# Patient Record
Sex: MALE | Race: WHITE | Employment: FULL TIME | URBAN - METROPOLITAN AREA
[De-identification: names, ages, dates, MRNs, and addresses within clinical notes are randomized per-mention and may not be internally consistent; named-entity substitution may affect disease eponyms.]

---

## 2017-11-06 ENCOUNTER — HOSPITAL ENCOUNTER (OUTPATIENT)
Dept: PHYSICAL THERAPY | Age: 57
Discharge: HOME | End: 2017-11-06
Payer: COMMERCIAL

## 2017-11-06 PROCEDURE — 97162 PT EVAL MOD COMPLEX 30 MIN: CPT

## 2017-11-06 NOTE — PROGRESS NOTES
Ambulatory/Rehab Services H2 Model Falls Risk Assessment    Risk Factor Pts. ·   Confusion/Disorientation/Impulsivity  []    4 ·   Symptomatic Depression  []   2 ·   Altered Elimination  []   1 ·   Dizziness/Vertigo  []   1 ·   Gender (Male)  [x]   1 ·   Any administered antiepileptics (anticonvulsants):  []   2 ·   Any administered benzodiazepines:  []   1 ·   Visual Impairment (specify):  []   1 ·   Portable Oxygen Use  []   1 ·   Orthostatic ? BP  []   1 ·   History of Recent Falls (within 3 mos.)  []   5     Ability to Rise from Chair (choose one) Pts. ·   Ability to rise in a single movement  []   0 ·   Pushes up, successful in one attempt  []   1 ·   Multiple attempts, but successful  []   3 ·   Unable to rise without assistance  []   4   Total: (5 or greater = High Risk) 1     Falls Prevention Plan:   []                Physical Limitations to Exercise (specify):   []                Mobility Assistance Device (type):   []                Exercise/Equipment Adaptation (specify):    ©2010 [facility] [address] Patent #[id number].  Federal Law prohibits the replication, distribution or use without written permission from [facility] [employer]

## 2017-11-06 NOTE — PROGRESS NOTES
Do Andrews  : 1960 [facility] [place] [address] [facility]  [address], [address]  [phone]   [phone]       OUTPATIENT PHYSICAL THERAPY:Initial Assessment 2017    ICD-10: Treatment Diagnosis:  R26.2 Difficulty in walking, not elsewhere classified   Precautions/Allergies:   Review of patient's allergies indicates not on file. Fall Risk Score: 1 (? 5 = High Risk)  MD Orders: Evaluate and treat MEDICAL/REFERRING DIAGNOSIS:  Strain of muscle, fascia and tendon of the posterior muscle group at thigh level, right thigh, initial encounter [address]: 3/2017  REFERRING PHYSICIAN: Geena Jasso MD  RETURN PHYSICIAN APPOINTMENT: TBD     INITIAL ASSESSMENT:  Mr. Mark Bynum reported running in 2017 and injuring his R hamstring. Since the initial injury his pain levels and function have improved, but he is still unable to run more than a short distance. He has been referred to physical therapy for treatment. *    PROBLEM LIST (Impacting functional limitations):  1. Decreased Strength  2. Decreased ADL/Functional Activities  3. Increased Pain  4. Decreased Activity Tolerance INTERVENTIONS PLANNED:  1. Home Exercise Program (HEP)  2. Neuromuscular Re-education/Strengthening  3. Range of Motion (ROM)  4. Therapeutic Exercise/Strengthening   TREATMENT PLAN:  Effective Dates: 2017 TO 2018. Frequency/Duration: 1 time a week for 8 weeks  GOALS: (Goals have been discussed and agreed upon with patient.)  SHORT-TERM FUNCTIONAL GOALS: Time Frame: 4 weeks  1. Increase LEFS 4 points to decrease pain 1-2 levels. 2. Increase R LE strength to decrease pain 1-2 levels. 3. Increase R LE strength to allow initiation of running again. Jf Mullins DISCHARGE GOALS: Time Frame: 8 weeks  1. Increase LEFS 9 points to decrease pain +2 levels. 2. Increase R LE strength to allow run again. .  3. Demonstrate independence in home exercises to allow DC from physical therapy. Rehabilitation Potential For Stated Goals: Good  Regarding Judy Johnson's therapy, I certify that the treatment plan above will be carried out by a therapist or under their direction. Thank you for this referral,  Griselda Borer., PT     Referring Physician Signature: Darcy Limon MD              Date                    The information in this section was collected on 11/6/2017 (except where otherwise noted). HISTORY:   History of Present Injury/Illness (Reason for Referral): The patient reported running in March 2017 and injuring his R hamstring. Since the initial injury his pain levels and function have improved, but he is still unable to run more than a short distance. He has been referred to physical therapy for treatment. Past Medical History/Comorbidities:   Mr. Mary Jo Alcazar  has no past medical history on file. Mr. Mary Jo Alcazar  has no past surgical history on file. Social History/Living Environment:      Prior Level of Function/Work/Activity:  [employer]  Dominant Side:         RIGHT    Current Medications:     No current outpatient prescriptions on file. Date Last Reviewed: 11/6/2017   Number of Personal Factors/Comorbidities that affect the Plan of Care: 1-2: MODERATE COMPLEXITY   EXAMINATION:     Observation/Orthostatic Postural Assessment:   WNL. Palpation:   Tenderness to R hamstring origin. ROM: PROM : Knee   Hamstrings 50 degrees bilaterally. Strength:      5/5 L LE Strength   -4/5 R LE Strength           Special Tests: N/A  Neurological Screen: N/A  Functional Mobility: Independent   Balance:  Good. Body Structures Involved:  1. Muscles Body Functions Affected:  1. Sensory/Pain  2. Neuromusculoskeletal  3. Movement Related Activities and Participation Affected:  1. General Tasks and Demands  2. Mobility  3.  Community, Social and [place]   Number of elements (examined above) that affect the Plan of Care: 3: MODERATE COMPLEXITY   CLINICAL PRESENTATION:   Presentation: Evolving clinical presentation with changing clinical characteristics: MODERATE COMPLEXITY   CLINICAL DECISION MAKING:   Outcome Measure: Tool Used: Lower Extremity Functional Scale (LEFS)  Score:  Initial: 70/80 Most Recent: X/80 (Date: -- )   Interpretation of Score: 20 questions each scored on a 5 point scale with 0 representing \"extreme difficulty or unable to perform\" and 4 representing \"no difficulty\". The lower the score, the greater the functional disability. 80/80 represents no disability. Minimal detectable change is 9 points. Score 80 79-63 62-48 47-32 31-16 15-1 0   Modifier CH CI CJ CK CL CM CN     ? Mobility - Walking and Moving Around:     - CURRENT STATUS: CI - 1%-19% impaired, limited or restricted    - GOAL STATUS: CH - 0% impaired, limited or restricted    - D/C STATUS:  ---------------To be determined---------------      Medical Necessity:   · Patient demonstrates good rehab potential due to higher previous functional level. Reason for Services/Other Comments:  · Patient continues to require skilled intervention due to his inability to perform prolonged activities on his feet without R hamstring pain. .   Use of outcome tool(s) and clinical judgement create a POC that gives a: Questionable prediction of patient's progress: MODERATE COMPLEXITY            TREATMENT:   (In addition to Assessment/Re-Assessment sessions the following treatments were rendered)  Pre-treatment Symptoms/Complaints:  The patient reported running in March 2017 and injuring his R hamstring. Since the initial injury his pain levels and function have improved, but he is still unable to run more than a short distance. He has been referred to physical therapy for treatment. Pain: Initial: Pain Intensity 1: 1  Post Session:  1   Therapeutic Exercise: ( ):  The patient received treatment as below to improve mobility, strength and balance.   Required moderate verbal and manual cues to promote proper body alignment, promote proper body posture and promote proper body mechanics. Progressed resistance, range and repetitions as indicated. The patient received exercise instruction followed by patient demonstration of the exercises to include AROM, PROM, and strengthening exercises for SLR, 4 Way Hip in standing with black t-band, single leg balance, crab walk with black t-band, R lateral planks, walking backward on t-mill, and hamstring stretching to stabilize the patient's LE and improve AROM to decrease spasms, pain, and improve function. [employer] [employer] Portal  Evaluation: ( X )  Manual Therapy (     ):   Therapeutic Modalities:                                                                                               HEP: As above; handouts given to patient for all exercises. Treatment/Session Assessment:    · Response to Treatment:  The patient tolerated today's treatment without symptom exacerbation. The patient demonstrated independence with the initial home exercises and is to perform the exercises in conjunction with continued physical therapy. The patient should progress to an independent home exercise program within a few weeks. · Compliance with Program/Exercises: compliant all of the time. · Recommendations/Intent for next treatment session: \"Next visit will focus on advancements to more challenging activities\".    Total Treatment Duration:  PT Patient Time In/Time Out  Time In: 1000  Time Out: [facility]., PT

## 2017-11-13 ENCOUNTER — HOSPITAL ENCOUNTER (OUTPATIENT)
Dept: PHYSICAL THERAPY | Age: 57
Discharge: HOME | End: 2017-11-13
Payer: COMMERCIAL

## 2017-11-13 PROCEDURE — 97110 THERAPEUTIC EXERCISES: CPT

## 2017-11-13 NOTE — PROGRESS NOTES
Marylen Spell  : 1960 [facility]  [address], [facility], [address]  [phone]   [phone]       OUTPATIENT PHYSICAL THERAPY:Daily Note 2017    ICD-10: Treatment Diagnosis:  R26.2 Difficulty in walking, not elsewhere classified   Precautions/Allergies:   Review of patient's allergies indicates not on file. Fall Risk Score: 1 (? 5 = High Risk)  MD Orders: Evaluate and treat MEDICAL/REFERRING DIAGNOSIS:  Strain of muscle, fascia and tendon of the posterior muscle group at thigh level, right thigh, initial encounter [address]: 3/2017  REFERRING PHYSICIAN: Diana Dawkins MD  RETURN PHYSICIAN APPOINTMENT: TBD     INITIAL ASSESSMENT:  Mr. Srini Keita reported running in 2017 and injuring his R hamstring. Since the initial injury his pain levels and function have improved, but he is still unable to run more than a short distance. He has been referred to physical therapy for treatment. *    PROBLEM LIST (Impacting functional limitations):  1. Decreased Strength  2. Decreased ADL/Functional Activities  3. Increased Pain  4. Decreased Activity Tolerance INTERVENTIONS PLANNED:  1. Home Exercise Program (HEP)  2. Neuromuscular Re-education/Strengthening  3. Range of Motion (ROM)  4. Therapeutic Exercise/Strengthening   TREATMENT PLAN:  Effective Dates: 2017 TO 2018. Frequency/Duration: 1 time every two  weeks for 8 weeks  GOALS: (Goals have been discussed and agreed upon with patient.)  SHORT-TERM FUNCTIONAL GOALS: Time Frame: 4 weeks  1. Increase LEFS 4 points to decrease pain 1-2 levels. 2. Increase R LE strength to decrease pain 1-2 levels. 3. Increase R LE strength to allow initiation of running again. Jessica Vargas DISCHARGE GOALS: Time Frame: 8 weeks  1. Increase LEFS 9 points to decrease pain +2 levels. 2. Increase R LE strength to allow run again. .  3. Demonstrate independence in home exercises to allow DC from physical therapy. Rehabilitation Potential For Stated Goals: Good  Regarding Chico Johnson's therapy, I certify that the treatment plan above will be carried out by a therapist or under their direction. Thank you for this referral,  Basil Cha., PT     Referring Physician Signature: Carolyne Thomas MD              Date                    The information in this section was collected on 11/6/2017 (except where otherwise noted). HISTORY:   History of Present Injury/Illness (Reason for Referral): The patient reported running in March 2017 and injuring his R hamstring. Since the initial injury his pain levels and function have improved, but he is still unable to run more than a short distance. He has been referred to physical therapy for treatment. Past Medical History/Comorbidities:   Mr. Murali Morris  has no past medical history on file. Mr. Murali Morris  has no past surgical history on file. Social History/Living Environment:      Prior Level of Function/Work/Activity:  [employer]  Dominant Side:         RIGHT    Current Medications:     No current outpatient prescriptions on file. Date Last Reviewed: 11/13/2017   Number of Personal Factors/Comorbidities that affect the Plan of Care: 1-2: MODERATE COMPLEXITY   EXAMINATION:     Observation/Orthostatic Postural Assessment:   WNL. Palpation:   Tenderness to R hamstring origin. ROM: PROM : Knee   Hamstrings 50 degrees bilaterally. Strength:      5/5 L LE Strength   -4/5 R LE Strength           Special Tests: N/A  Neurological Screen: N/A  Functional Mobility: Independent   Balance:  Good. Body Structures Involved:  1. Muscles Body Functions Affected:  1. Sensory/Pain  2. Neuromusculoskeletal  3. Movement Related Activities and Participation Affected:  1. General Tasks and Demands  2. Mobility  3.  Community, Social and [place]   Number of elements (examined above) that affect the Plan of Care: 3: MODERATE COMPLEXITY   CLINICAL PRESENTATION:   Presentation: Evolving clinical presentation with changing clinical characteristics: MODERATE COMPLEXITY   CLINICAL DECISION MAKING:   Outcome Measure: Tool Used: Lower Extremity Functional Scale (LEFS)  Score:  Initial: 70/80 Most Recent: X/80 (Date: -- )   Interpretation of Score: 20 questions each scored on a 5 point scale with 0 representing \"extreme difficulty or unable to perform\" and 4 representing \"no difficulty\". The lower the score, the greater the functional disability. 80/80 represents no disability. Minimal detectable change is 9 points. Score 80 79-63 62-48 47-32 31-16 15-1 0   Modifier CH CI CJ CK CL CM CN     ? Mobility - Walking and Moving Around:     - CURRENT STATUS: CI - 1%-19% impaired, limited or restricted    - GOAL STATUS: CH - 0% impaired, limited or restricted    - D/C STATUS:  ---------------To be determined---------------      Medical Necessity:   · Patient demonstrates good rehab potential due to higher previous functional level. Reason for Services/Other Comments:  · Patient continues to require skilled intervention due to his inability to perform prolonged activities on his feet without R hamstring pain. .   Use of outcome tool(s) and clinical judgement create a POC that gives a: Questionable prediction of patient's progress: MODERATE COMPLEXITY            TREATMENT:   (In addition to Assessment/Re-Assessment sessions the following treatments were rendered)  Pre-treatment Symptoms/Complaints:  The patient reported being able feel a difference in strength with the HEP. Pain: Initial: Pain Intensity 1: 1  Post Session:  1   Therapeutic Exercise: ( 45): The patient received treatment as below to improve mobility, strength and balance. Required moderate verbal and manual cues to promote proper body alignment, promote proper body posture and promote proper body mechanics. Progressed resistance, range and repetitions as indicated.   The patient received exercise instruction followed by patient demonstration of the exercises to include AROM, PROM, and strengthening exercises for SLR, 4 Way Hip in standing with black t-band, single leg balance, crab walk with black t-band, R lateral planks, walking backward on t-mill, and hamstring stretching to stabilize the patient's LE and improve AROM to decrease spasms, pain, and improve function. Date:  11/13/2017 Date:   Date:     Activity/Exercise Parameters Parameters Parameters   MAT  EXERCISES:      Hip ER / Abduction 3 x 10     Hip Adduction (Ball Squeeze)      Bridging 3 x 10     Straight Leg Raise      SAQ      Hip Abduction (Sidelying) 3 x 10     Gluteus Medius (Clam Shell)      Sit To Stand            STANDING HIP MACHINE:      Hip Abduction      Hip Flexion      Hip Extension      Hip Adduction      Standing Knee Extension      4 Way Cable Walkout [email or web address] x 10     Gluteus Medius (Crab Walk)      Knee Extension [email or web address] x 10     Knee Flexion [email or web address] x 10     Step Downs 3 x 10           Stationary Bike      Treadmill      NuStep 10 min           FLEXIBILITY:      Heel Cord      Hamstring 5 min     Hip Flexors              [employer] Portal  Evaluation: (  )  Manual Therapy (     ):   Therapeutic Modalities:                                                                                               HEP: As above; handouts given to patient for all exercises. Treatment/Session Assessment:    · Response to Treatment:  The patient tolerated today's treatment without symptom exacerbation. The patient demonstrated good exercise tolerance to the above exercises and required manual and verbal instructions. The patient should progress to an independent home exercise program within a few weeks. · Compliance with Program/Exercises: compliant all of the time. · Recommendations/Intent for next treatment session: \"Next visit will focus on advancements to more challenging activities\".    Total Treatment Duration:  PT Patient Time In/Time Out  Time In: 0900  Time Out: [address] Wolff., PT

## 2017-11-27 ENCOUNTER — HOSPITAL ENCOUNTER (OUTPATIENT)
Dept: PHYSICAL THERAPY | Age: 57
Discharge: HOME | End: 2017-11-27
Payer: COMMERCIAL

## 2017-11-27 PROCEDURE — 97110 THERAPEUTIC EXERCISES: CPT

## 2017-11-27 NOTE — PROGRESS NOTES
Deion Alvarez  : 1960 [facility] at [facility]  [address], [facility], [address]  [phone]   [phone]       OUTPATIENT PHYSICAL THERAPY:Daily Note 2017    ICD-10: Treatment Diagnosis:  R26.2 Difficulty in walking, not elsewhere classified   Precautions/Allergies:   Review of patient's allergies indicates not on file. Fall Risk Score: 1 (? 5 = High Risk)  MD Orders: Evaluate and treat MEDICAL/REFERRING DIAGNOSIS:  Strain of muscle, fascia and tendon of the posterior muscle group at thigh level, right thigh, initial encounter [address]: 3/2017  REFERRING PHYSICIAN: Tristen Bateman MD  RETURN PHYSICIAN APPOINTMENT: TBD     INITIAL ASSESSMENT:  Mr. Yoli Thompson reported running in 2017 and injuring his R hamstring. Since the initial injury his pain levels and function have improved, but he is still unable to run more than a short distance. He has been referred to physical therapy for treatment. *    PROBLEM LIST (Impacting functional limitations):  1. Decreased Strength  2. Decreased ADL/Functional Activities  3. Increased Pain  4. Decreased Activity Tolerance INTERVENTIONS PLANNED:  1. Home Exercise Program (HEP)  2. Neuromuscular Re-education/Strengthening  3. Range of Motion (ROM)  4. Therapeutic Exercise/Strengthening   TREATMENT PLAN:  Effective Dates: 2017 TO 2018. Frequency/Duration: 1 time every two weeks for 8 weeks  GOALS: (Goals have been discussed and agreed upon with patient.)  SHORT-TERM FUNCTIONAL GOALS: Time Frame: 4 weeks  1. Increase LEFS 4 points to decrease pain 1-2 levels. 2. Increase R LE strength to decrease pain 1-2 levels. 3. Increase R LE strength to allow initiation of running again. Greg Palacio DISCHARGE GOALS: Time Frame: 8 weeks  1. Increase LEFS 9 points to decrease pain +2 levels. 2. Increase R LE strength to allow run again. .  3. Demonstrate independence in home exercises to allow DC from physical therapy. Rehabilitation Potential For Stated Goals: Good  Regarding Eli Johnson's therapy, I certify that the treatment plan above will be carried out by a therapist or under their direction. Thank you for this referral,  Kandis Smiley, PT     Referring Physician Signature: Anastacio Shone, MD              Date                    The information in this section was collected on 11/6/2017 (except where otherwise noted). HISTORY:   History of Present Injury/Illness (Reason for Referral): The patient reported running in March 2017 and injuring his R hamstring. Since the initial injury his pain levels and function have improved, but he is still unable to run more than a short distance. He has been referred to physical therapy for treatment. Past Medical History/Comorbidities:   Mr. Jennifer Andrade  has no past medical history on file. Mr. Jennifer Andrade  has no past surgical history on file. Social History/Living Environment:      Prior Level of Function/Work/Activity:  [employer]  Dominant Side:         RIGHT    Current Medications:     No current outpatient prescriptions on file. Date Last Reviewed: 11/27/2017   Number of Personal Factors/Comorbidities that affect the Plan of Care: 1-2: MODERATE COMPLEXITY   EXAMINATION:     Observation/Orthostatic Postural Assessment:   WNL. Palpation:   Tenderness to R hamstring origin. ROM: PROM : Knee   Hamstrings 50 degrees bilaterally. Strength:      5/5 L LE Strength   -4/5 R LE Strength           Special Tests: N/A  Neurological Screen: N/A  Functional Mobility: Independent   Balance:  Good. Body Structures Involved:  1. Muscles Body Functions Affected:  1. Sensory/Pain  2. Neuromusculoskeletal  3. Movement Related Activities and Participation Affected:  1. General Tasks and Demands  2. Mobility  3.  Community, Social and [place]   Number of elements (examined above) that affect the Plan of Care: 3: MODERATE COMPLEXITY   CLINICAL PRESENTATION:   Presentation: Evolving clinical presentation with changing clinical characteristics: MODERATE COMPLEXITY   CLINICAL DECISION MAKING:   Outcome Measure: Tool Used: Lower Extremity Functional Scale (LEFS)  Score:  Initial: 70/80 Most Recent: X/80 (Date: -- )   Interpretation of Score: 20 questions each scored on a 5 point scale with 0 representing \"extreme difficulty or unable to perform\" and 4 representing \"no difficulty\". The lower the score, the greater the functional disability. 80/80 represents no disability. Minimal detectable change is 9 points. Score 80 79-63 62-48 47-32 31-16 15-1 0   Modifier CH CI CJ CK CL CM CN     ? Mobility - Walking and Moving Around:     - CURRENT STATUS: CI - 1%-19% impaired, limited or restricted    - GOAL STATUS: CH - 0% impaired, limited or restricted    - D/C STATUS:  ---------------To be determined---------------      Medical Necessity:   · Patient demonstrates good rehab potential due to higher previous functional level. Reason for Services/Other Comments:  · Patient continues to require skilled intervention due to his inability to perform prolonged activities on his feet without R hamstring pain. .   Use of outcome tool(s) and clinical judgement create a POC that gives a: Questionable prediction of patient's progress: MODERATE COMPLEXITY            TREATMENT:   (In addition to Assessment/Re-Assessment sessions the following treatments were rendered)  Pre-treatment Symptoms/Complaints:  The patient reported continued improvement, and was able to walk a 10K at [facility]. Pain: Initial: Pain Intensity 1: 1  Post Session:  1   Therapeutic Exercise: ( 60): The patient received treatment as below to improve mobility, strength and balance. Required moderate verbal and manual cues to promote proper body alignment, promote proper body posture and promote proper body mechanics. Progressed resistance, range and repetitions as indicated.   The patient received exercise instruction followed by patient demonstration of the exercises to include AROM, PROM, and strengthening exercises for SLR, 4 Way Hip in standing with black t-band, single leg balance, crab walk with black t-band, R lateral planks, walking backward on t-mill, and hamstring stretching to stabilize the patient's LE and improve AROM to decrease spasms, pain, and improve function. Date:  11/13/2017 Date:  11/27/2017 Date:     Activity/Exercise Parameters Parameters Parameters   MAT  EXERCISES:      Hip ER / Abduction 3 x 10     Hip Adduction (Ball Squeeze)      Bridging 3 x 10     Straight Leg Raise      SAQ      Hip Abduction (Sidelying) 3 x 10     Gluteus Medius (Clam Shell)      Sit To Stand            STANDING HIP MACHINE:      Hip Abduction  [email or web address] x 15(B)    Hip Flexion  [email or web address] x 15(B)    Hip Extension  [email or web address] x 15(B)    Hip Adduction      Standing Knee Extension      4 Way Cable Walkout [email or web address] x 10 [email or web address] x 10    Gluteus Medius (Crab Walk)      Knee Extension [email or web address] x 10 [email or web address] x 10    Knee Flexion [email or web address] x 10 [email or web address] x 10    Step Downs 3 x 10           Stationary Bike      Treadmill      NuStep 10 min           FLEXIBILITY:      Heel Cord      Hamstring 5 min 5 min    Hip Flexors              MedBridge Portal  Evaluation: (  )  Manual Therapy (     ):   Therapeutic Modalities:                                                                                               HEP: As above; handouts given to patient for all exercises. Treatment/Session Assessment:    · Response to Treatment:  The patient tolerated today's treatment without symptom exacerbation. The patient demonstrated good exercise tolerance to the above exercises with increased weights and required manual and verbal instructions. The patient should progress to an independent home exercise program within a few weeks. · Compliance with Program/Exercises: compliant all of the time.   · Recommendations/Intent for next treatment session: \"Next visit will focus on advancements to more challenging activities\".    Total Treatment Duration:  PT Patient Time In/Time Out  Time In: 0800  Time Out: 0900    Allison Essex., PT

## 2017-12-11 ENCOUNTER — HOSPITAL ENCOUNTER (OUTPATIENT)
Dept: PHYSICAL THERAPY | Age: 57
Discharge: HOME | End: 2017-12-11
Payer: COMMERCIAL

## 2017-12-11 PROCEDURE — 97110 THERAPEUTIC EXERCISES: CPT

## 2017-12-11 NOTE — PROGRESS NOTES
Felix Home  : 1960 [facility] at [facility]  [address], [facility], [address]  [phone]   [phone]       OUTPATIENT PHYSICAL THERAPY:Daily Note 2017    ICD-10: Treatment Diagnosis:  R26.2 Difficulty in walking, not elsewhere classified   Precautions/Allergies:   Review of patient's allergies indicates not on file. Fall Risk Score: 1 (? 5 = High Risk)  MD Orders: Evaluate and treat MEDICAL/REFERRING DIAGNOSIS:  Strain of muscle, fascia and tendon of the posterior muscle group at thigh level, right thigh, initial encounter [address]: 3/2017  REFERRING PHYSICIAN: Carolyne Thomas MD  RETURN PHYSICIAN APPOINTMENT: TBD     INITIAL ASSESSMENT:  Mr. Murali Morris reported running in 2017 and injuring his R hamstring. Since the initial injury his pain levels and function have improved, but he is still unable to run more than a short distance. He has been referred to physical therapy for treatment. *    PROBLEM LIST (Impacting functional limitations):  1. Decreased Strength  2. Decreased ADL/Functional Activities  3. Increased Pain  4. Decreased Activity Tolerance INTERVENTIONS PLANNED:  1. Home Exercise Program (HEP)  2. Neuromuscular Re-education/Strengthening  3. Range of Motion (ROM)  4. Therapeutic Exercise/Strengthening   TREATMENT PLAN:  Effective Dates: 2017 TO 2018. Frequency/Duration: 1 time every two weeks for 8 weeks  GOALS: (Goals have been discussed and agreed upon with patient.)  SHORT-TERM FUNCTIONAL GOALS: Time Frame: 4 weeks  1. Increase LEFS 4 points to decrease pain 1-2 levels. 2. Increase R LE strength to decrease pain 1-2 levels. 3. Increase R LE strength to allow initiation of running again. Jeannette  DISCHARGE GOALS: Time Frame: 8 weeks  1. Increase LEFS 9 points to decrease pain +2 levels. 2. Increase R LE strength to allow run again. .  3. Demonstrate independence in home exercises to allow DC from physical therapy. Rehabilitation Potential For Stated Goals: Good  Regarding Marybel Johnson's therapy, I certify that the treatment plan above will be carried out by a therapist or under their direction. Thank you for this referral,  Ladd Boeck., PT     Referring Physician Signature: Reid Bruner MD              Date                    The information in this section was collected on 11/6/2017 (except where otherwise noted). HISTORY:   History of Present Injury/Illness (Reason for Referral): The patient reported running in March 2017 and injuring his R hamstring. Since the initial injury his pain levels and function have improved, but he is still unable to run more than a short distance. He has been referred to physical therapy for treatment. Past Medical History/Comorbidities:   Mr. Adiel Oconnor  has no past medical history on file. Mr. Adiel Oconnor  has no past surgical history on file. Social History/Living Environment:      Prior Level of Function/Work/Activity:  [employer]  Dominant Side:         RIGHT    Current Medications:     No current outpatient prescriptions on file. Date Last Reviewed: 12/11/2017   Number of Personal Factors/Comorbidities that affect the Plan of Care: 1-2: MODERATE COMPLEXITY   EXAMINATION:     Observation/Orthostatic Postural Assessment:   WNL. Palpation:   Tenderness to R hamstring origin. ROM: PROM : Knee   Hamstrings 50 degrees bilaterally. Strength:      5/5 L LE Strength   -4/5 R LE Strength           Special Tests: N/A  Neurological Screen: N/A  Functional Mobility: Independent   Balance:  Good. Body Structures Involved:  1. Muscles Body Functions Affected:  1. Sensory/Pain  2. Neuromusculoskeletal  3. Movement Related Activities and Participation Affected:  1. General Tasks and Demands  2. Mobility  3.  Community, Social and [place]   Number of elements (examined above) that affect the Plan of Care: 3: MODERATE COMPLEXITY   CLINICAL PRESENTATION:   Presentation: Evolving clinical presentation with changing clinical characteristics: MODERATE COMPLEXITY   CLINICAL DECISION MAKING:   Outcome Measure: Tool Used: Lower Extremity Functional Scale (LEFS)  Score:  Initial: 70/80 Most Recent: X/80 (Date: -- )   Interpretation of Score: 20 questions each scored on a 5 point scale with 0 representing \"extreme difficulty or unable to perform\" and 4 representing \"no difficulty\". The lower the score, the greater the functional disability. 80/80 represents no disability. Minimal detectable change is 9 points. Score 80 79-63 62-48 47-32 31-16 15-1 0   Modifier CH CI CJ CK CL CM CN     ? Mobility - Walking and Moving Around:     - CURRENT STATUS: CI - 1%-19% impaired, limited or restricted    - GOAL STATUS: CH - 0% impaired, limited or restricted    - D/C STATUS:  ---------------To be determined---------------      Medical Necessity:   · Patient demonstrates good rehab potential due to higher previous functional level. Reason for Services/Other Comments:  · Patient continues to require skilled intervention due to his inability to perform prolonged activities on his feet without R hamstring pain. .   Use of outcome tool(s) and clinical judgement create a POC that gives a: Questionable prediction of patient's progress: MODERATE COMPLEXITY            TREATMENT:   (In addition to Assessment/Re-Assessment sessions the following treatments were rendered)  Pre-treatment Symptoms/Complaints:  The patient reported continued improvement, and is approaching recover as a three to six month provess. Pain: Initial: Pain Intensity 1: 1  Post Session:  1   Therapeutic Exercise: ( 60): The patient received treatment as below to improve mobility, strength and balance. Required moderate verbal and manual cues to promote proper body alignment, promote proper body posture and promote proper body mechanics.   Progressed resistance, range and repetitions as indicated. The patient received exercise instruction followed by patient demonstration of the exercises to include AROM, PROM, and strengthening exercises for SLR, 4 Way Hip in standing with black t-band, single leg balance, crab walk with black t-band, R lateral planks, walking backward on t-mill, and hamstring stretching to stabilize the patient's LE and improve AROM to decrease spasms, pain, and improve function. Date:  11/13/2017 Date:  11/27/2017 Date:  12/11/2017   Activity/Exercise Parameters Parameters Parameters   MAT  EXERCISES:      Hip ER / Abduction 3 x 10     Hip Adduction (Ball Squeeze)      Bridging 3 x 10     Straight Leg Raise      SAQ      Hip Abduction (Sidelying) 3 x 10     Gluteus Medius (Clam Shell)      Sit To Stand            STANDING HIP MACHINE:      Hip Abduction  [email or web address] x 15(B) [email or web address] x 15(B)   Hip Flexion  [email or web address] x 15(B) [email or web address] x 15(B)   Hip Extension  [email or web address] x 15(B) [email or web address] x 15(B)   Hip Adduction      Standing Knee Extension      4 Way Cable Walkout [email or web address] x 10 [email or web address] x 10    Gluteus Medius (Crab Walk)      Knee Extension [email or web address] x 10 [email or web address] x 10 [email or web address] x 15(B)   Knee Flexion [email or web address] x 10 [email or web address] x 10 [email or web address] x 15(B)   Step Downs 3 x 10           Stationary Bike      Treadmill      NuStep 10 min           FLEXIBILITY:      Heel Cord      Hamstring 5 min 5 min 5 min   Hip Flexors              MedBridge Portal  Evaluation: (  )  Manual Therapy (     ):   Therapeutic Modalities:                                                                                               HEP: As above; handouts given to patient for all exercises. Treatment/Session Assessment:    · Response to Treatment:  The patient tolerated today's treatment without symptom exacerbation. The patient demonstrated good exercise tolerance to the above exercises with increased weights and required manual and verbal instructions. The patient is pleased with his steady improvements and has a healthy long term perspective to his recovery. · Compliance with Program/Exercises: compliant all of the time. · Recommendations/Intent for next treatment session: \"Next visit will focus on advancements to more challenging activities\".    Total Treatment Duration:  PT Patient Time In/Time Out  Time In: 0800  Time Out: [address] Zane Paez., PT

## 2018-01-02 ENCOUNTER — HOSPITAL ENCOUNTER (OUTPATIENT)
Dept: PHYSICAL THERAPY | Age: 58
Discharge: HOME | End: 2018-01-02
Payer: COMMERCIAL

## 2018-01-02 PROCEDURE — 97110 THERAPEUTIC EXERCISES: CPT

## 2018-01-02 NOTE — PROGRESS NOTES
Aarti Fabian  : 1960 [facility] [place] [address] Therapy  [address], [address]  [phone]   [phone]       OUTPATIENT PHYSICAL THERAPY:Daily Note 2018    ICD-10: Treatment Diagnosis:  R26.2 Difficulty in walking, not elsewhere classified   Precautions/Allergies:   Review of patient's allergies indicates not on file. Fall Risk Score: 1 (? 5 = High Risk)  MD Orders: Evaluate and treat MEDICAL/REFERRING DIAGNOSIS:  Strain of muscle, fascia and tendon of the posterior muscle group at thigh level, right thigh, initial encounter [address]: 3/2017  REFERRING PHYSICIAN: Giuliano Meraz MD  RETURN PHYSICIAN APPOINTMENT: TBD     INITIAL ASSESSMENT:  Mr. Phyllis Rasmussen reported running in 2017 and injuring his R hamstring. Since the initial injury his pain levels and function have improved, but he is still unable to run more than a short distance. He has been referred to physical therapy for treatment. *    PROBLEM LIST (Impacting functional limitations):  1. Decreased Strength  2. Decreased ADL/Functional Activities  3. Increased Pain  4. Decreased Activity Tolerance INTERVENTIONS PLANNED:  1. Home Exercise Program (HEP)  2. Neuromuscular Re-education/Strengthening  3. Range of Motion (ROM)  4. Therapeutic Exercise/Strengthening   TREATMENT PLAN:  Effective Dates: 2017 TO 2018. Frequency/Duration: 1 time every two weeks for 8 weeks  GOALS: (Goals have been discussed and agreed upon with patient.)  SHORT-TERM FUNCTIONAL GOALS: Time Frame: 4 weeks  1. Increase LEFS 4 points to decrease pain 1-2 levels. 2. Increase R LE strength to decrease pain 1-2 levels. 3. Increase R LE strength to allow initiation of running again. [place] [facility] DISCHARGE GOALS: Time Frame: 8 weeks  1. Increase LEFS 9 points to decrease pain +2 levels. 2. Increase R LE strength to allow run again. .  3. Demonstrate independence in home exercises to allow DC from physical therapy. Rehabilitation Potential For Stated Goals: Good  Regarding Jimi Johnson's therapy, I certify that the treatment plan above will be carried out by a therapist or under their direction. Thank you for this referral,  Jorje Vega, PT     Referring Physician Signature: Dilshad Nelson MD              Date                    The information in this section was collected on 11/6/2017 (except where otherwise noted). HISTORY:   History of Present Injury/Illness (Reason for Referral): The patient reported running in March 2017 and injuring his R hamstring. Since the initial injury his pain levels and function have improved, but he is still unable to run more than a short distance. He has been referred to physical therapy for treatment. Past Medical History/Comorbidities:   Mr. Jenifer Haider  has no past medical history on file. Mr. Jenifer Haider  has no past surgical history on file. Social History/Living Environment:      Prior Level of Function/Work/Activity:  [employer]  Dominant Side:         RIGHT    Current Medications:     No current outpatient prescriptions on file. Date Last Reviewed: 1/2/2018   Number of Personal Factors/Comorbidities that affect the Plan of Care: 1-2: MODERATE COMPLEXITY   EXAMINATION:     Observation/Orthostatic Postural Assessment:   WNL. Palpation:   Tenderness to R hamstring origin. ROM: PROM : Knee   Hamstrings 50 degrees bilaterally. Strength:      5/5 L LE Strength   -4/5 R LE Strength           Special Tests: N/A  Neurological Screen: N/A  Functional Mobility: Independent   Balance:  Good. Body Structures Involved:  1. Muscles Body Functions Affected:  1. Sensory/Pain  2. Neuromusculoskeletal  3. Movement Related Activities and Participation Affected:  1. General Tasks and Demands  2. Mobility  3.  Community, Social and [place]   Number of elements (examined above) that affect the Plan of Care: 3: MODERATE COMPLEXITY   CLINICAL PRESENTATION:   Presentation: Evolving clinical presentation with changing clinical characteristics: MODERATE COMPLEXITY   CLINICAL DECISION MAKING:   Outcome Measure: Tool Used: Lower Extremity Functional Scale (LEFS)  Score:  Initial: 70/80 Most Recent: X/80 (Date: -- )   Interpretation of Score: 20 questions each scored on a 5 point scale with 0 representing \"extreme difficulty or unable to perform\" and 4 representing \"no difficulty\". The lower the score, the greater the functional disability. 80/80 represents no disability. Minimal detectable change is 9 points. Score 80 79-63 62-48 47-32 31-16 15-1 0   Modifier CH CI CJ CK CL CM CN     ? Mobility - Walking and Moving Around:     - CURRENT STATUS: CI - 1%-19% impaired, limited or restricted    - GOAL STATUS: CH - 0% impaired, limited or restricted    - D/C STATUS:  ---------------To be determined---------------      Medical Necessity:   · Patient demonstrates good rehab potential due to higher previous functional level. Reason for Services/Other Comments:  · Patient continues to require skilled intervention due to his inability to perform prolonged activities on his feet without R hamstring pain. .   Use of outcome tool(s) and clinical judgement create a POC that gives a: Questionable prediction of patient's progress: MODERATE COMPLEXITY            TREATMENT:   (In addition to Assessment/Re-Assessment sessions the following treatments were rendered)  Pre-treatment Symptoms/Complaints:  The patient reported continued improvement, and is ready to try jogging again since he walks 12 min. Anil Lehman. Pain: Initial: Pain Intensity 1: 1  Post Session:  1   Therapeutic Exercise: ( 45): The patient received treatment as below to improve mobility, strength and balance. Required moderate verbal and manual cues to promote proper body alignment, promote proper body posture and promote proper body mechanics.   Progressed resistance, range and repetitions as indicated. The patient received exercise instruction followed by patient demonstration of the exercises to include AROM, PROM, and strengthening exercises for SLR, 4 Way Hip in standing with black t-band, single leg balance, crab walk with black t-band, R lateral planks, walking backward on t-mill, and hamstring stretching to stabilize the patient's LE and improve AROM to decrease spasms, pain, and improve function. Date:  11/13/2017 Date:  11/27/2017 Date:  12/11/2017 Date:  01/02/2018    Activity/Exercise Parameters Parameters Parameters     MAT  EXERCISES:        Hip ER / Abduction 3 x 10       Hip Adduction (Ball Squeeze)        Bridging 3 x 10       Straight Leg Raise        SAQ        Hip Abduction (Sidelying) 3 x 10       Gluteus Medius (Clam Shell)        Sit To Stand                STANDING HIP MACHINE:        Hip Abduction  [email or web address] x 15(B) [email or web address] x 15(B)     Hip Flexion  [email or web address] x 15(B) [email or web address] x 15(B)     Hip Extension  [email or web address] x 15(B) [email or web address] x 15(B) [email or web address] x 15(B)    Hip Adduction        Standing Knee Extension        4 Way Cable Walkout [email or web address] x 10 [email or web address] x 10  [email or web address] x 15    Gluteus Medius (Crab Walk)        Knee Extension [email or web address] x 10 [email or web address] x 10 [email or web address] x 15(B) [email or web address] x 15(B)    Knee Flexion [email or web address] x 10 [email or web address] x 10 [email or web address] x 15(B) [email or web address] x 15(B)    Step Downs 3 x 10               Stationary Bike        Treadmill        NuStep 10 min               FLEXIBILITY:        Heel Cord        Hamstring 5 min 5 min 5 min     Hip Flexors                  [facility]  Evaluation: (  )  Manual Therapy (     ):   Therapeutic Modalities:                                                                                               HEP: As above; handouts given to patient for all exercises. Treatment/Session Assessment:    · Response to Treatment:  The patient tolerated today's treatment without symptom exacerbation.   The patient demonstrated good exercise tolerance to the above exercises with increased weights and required manual and verbal instructions. The patient is pleased with his steady improvements and is to try jogging again since he presently walks 12 minute miles. · Compliance with Program/Exercises: compliant all of the time. · Recommendations/Intent for next treatment session: \"Next visit will focus on advancements to more challenging activities\".    Total Treatment Duration:  PT Patient Time In/Time Out  Time In: 0900  Time Out: [place] [address] Carolina Cunningham., PT

## 2018-01-15 ENCOUNTER — HOSPITAL ENCOUNTER (OUTPATIENT)
Dept: PHYSICAL THERAPY | Age: 58
Discharge: HOME | End: 2018-01-15
Payer: COMMERCIAL

## 2018-01-15 PROCEDURE — 97110 THERAPEUTIC EXERCISES: CPT

## 2018-01-15 NOTE — PROGRESS NOTES
Charla Schuler  : 1960 [facility] [place] [address] Therapy  [address], [address]  [phone]   [phone]       OUTPATIENT PHYSICAL THERAPY:Daily Note 1/15/2018    ICD-10: Treatment Diagnosis:  R26.2 Difficulty in walking, not elsewhere classified   Precautions/Allergies:   Review of patient's allergies indicates not on file. Fall Risk Score: 1 (? 5 = High Risk)  MD Orders: Evaluate and treat MEDICAL/REFERRING DIAGNOSIS:  Strain of muscle, fascia and tendon of the posterior muscle group at thigh level, right thigh, initial encounter [address] [facility] [address]: 3/2017  REFERRING PHYSICIAN: Rica Haque MD  RETURN PHYSICIAN APPOINTMENT: TBD     INITIAL ASSESSMENT:  Mr. Martin Ball reported running in 2017 and injuring his R hamstring. Since the initial injury his pain levels and function have improved, but he is still unable to run more than a short distance. He has been referred to physical therapy for treatment. *    PROBLEM LIST (Impacting functional limitations):  1. Decreased Strength  2. Decreased ADL/Functional Activities  3. Increased Pain  4. Decreased Activity Tolerance INTERVENTIONS PLANNED:  1. Home Exercise Program (HEP)  2. Neuromuscular Re-education/Strengthening  3. Range of Motion (ROM)  4. Therapeutic Exercise/Strengthening   TREATMENT PLAN:  Effective Dates: 2017 TO 2018. Frequency/Duration: 1 time every two weeks for 8 weeks  GOALS: (Goals have been discussed and agreed upon with patient.)  SHORT-TERM FUNCTIONAL GOALS: Time Frame: 4 weeks  1. Increase LEFS 4 points to decrease pain 1-2 levels. 2. Increase R LE strength to decrease pain 1-2 levels. 3. Increase R LE strength to allow initiation of running again. [facility] DISCHARGE GOALS: Time Frame: 8 weeks  1. Increase LEFS 9 points to decrease pain +2 levels. 2. Increase R LE strength to allow run again. .  3. Demonstrate independence in home exercises to allow DC from physical therapy. Rehabilitation Potential For Stated Goals: Good  Regarding Zane Johnson's therapy, I certify that the treatment plan above will be carried out by a therapist or under their direction. Thank you for this referral,  Richelle Maldonado, PT     Referring Physician Signature: Lindsey Paula MD              Date                    The information in this section was collected on 11/6/2017 (except where otherwise noted). HISTORY:   History of Present Injury/Illness (Reason for Referral): The patient reported running in March 2017 and injuring his R hamstring. Since the initial injury his pain levels and function have improved, but he is still unable to run more than a short distance. He has been referred to physical therapy for treatment. Past Medical History/Comorbidities:   Mr. Hanny Dillon  has no past medical history on file. Mr. Hanny Dillon  has no past surgical history on file. Social History/Living Environment:      Prior Level of Function/Work/Activity:  [employer]  Dominant Side:         RIGHT    Current Medications:     No current outpatient prescriptions on file. Date Last Reviewed: 1/15/2018   Number of Personal Factors/Comorbidities that affect the Plan of Care: 1-2: MODERATE COMPLEXITY   EXAMINATION:     Observation/Orthostatic Postural Assessment:   WNL. Palpation:   Tenderness to R hamstring origin. ROM: PROM : Knee   Hamstrings 50 degrees bilaterally. Strength:      5/5 L LE Strength   -4/5 R LE Strength           Special Tests: N/A  Neurological Screen: N/A  Functional Mobility: Independent   Balance:  Good. Body Structures Involved:  1. Muscles Body Functions Affected:  1. Sensory/Pain  2. Neuromusculoskeletal  3. Movement Related Activities and Participation Affected:  1. General Tasks and Demands  2. Mobility  3.  Community, Social and [place]   Number of elements (examined above) that affect the Plan of Care: 3: MODERATE COMPLEXITY   CLINICAL PRESENTATION:   Presentation: Evolving clinical presentation with changing clinical characteristics: MODERATE COMPLEXITY   CLINICAL DECISION MAKING:   Outcome Measure: Tool Used: Lower Extremity Functional Scale (LEFS)  Score:  Initial: 70/80 Most Recent: X/80 (Date: -- )   Interpretation of Score: 20 questions each scored on a 5 point scale with 0 representing \"extreme difficulty or unable to perform\" and 4 representing \"no difficulty\". The lower the score, the greater the functional disability. 80/80 represents no disability. Minimal detectable change is 9 points. Score 80 79-63 62-48 47-32 31-16 15-1 0   Modifier CH CI CJ CK CL CM CN     ? Mobility - Walking and Moving Around:     - CURRENT STATUS: CI - 1%-19% impaired, limited or restricted    - GOAL STATUS: CH - 0% impaired, limited or restricted    - D/C STATUS:  ---------------To be determined---------------      Medical Necessity:   · Patient demonstrates good rehab potential due to higher previous functional level. Reason for Services/Other Comments:  · Patient continues to require skilled intervention due to his inability to perform prolonged activities on his feet without R hamstring pain. .   Use of outcome tool(s) and clinical judgement create a POC that gives a: Questionable prediction of patient's progress: MODERATE COMPLEXITY            TREATMENT:   (In addition to Assessment/Re-Assessment sessions the following treatments were rendered)  Pre-treatment Symptoms/Complaints:  The patient reported continued improvement. Pain: Initial: Pain Intensity 1: 1  Post Session:  1   Therapeutic Exercise: ( 30): The patient received treatment as below to improve mobility, strength and balance. Required moderate verbal and manual cues to promote proper body alignment, promote proper body posture and promote proper body mechanics. Progressed resistance, range and repetitions as indicated.   The patient received exercise instruction followed by patient demonstration of the exercises to include AROM, PROM, and strengthening exercises for SLR, 4 Way Hip in standing with black t-band, single leg balance, crab walk with black t-band, R lateral planks, walking backward on t-mill, and hamstring stretching to stabilize the patient's LE and improve AROM to decrease spasms, pain, and improve function. Date:  11/13/2017 Date:  11/27/2017 Date:  12/11/2017 Date:  01/02/2018 Date:  1/15/2018   Activity/Exercise Parameters Parameters Parameters     MAT  EXERCISES:        Hip ER / Abduction 3 x 10       Hip Adduction (Ball Squeeze)        Bridging 3 x 10       Straight Leg Raise        SAQ        Hip Abduction (Sidelying) 3 x 10       Gluteus Medius (Clam Shell)        Sit To Stand                STANDING HIP MACHINE:        Hip Abduction  [email or web address] x 15(B) [email or web address] x 15(B)     Hip Flexion  [email or web address] x 15(B) [email or web address] x 15(B)     Hip Extension  [email or web address] x 15(B) [email or web address] x 15(B) [email or web address] x 15(B) [email or web address] x 15(B)   Hip Adduction        Standing Knee Extension        4 Way Cable Walkout [email or web address] x 10 [email or web address] x 10  [email or web address] x 15    Gluteus Medius (Crab Walk)        Knee Extension [email or web address] x 10 [email or web address] x 10 [email or web address] x 15(B) [email or web address] x 15(B)    Knee Flexion [email or web address] x 10 [email or web address] x 10 [email or web address] x 15(B) [email or web address] x 15(B) [email or web address] x 15(B)   Step Downs 3 x 10               Stationary Bike        Treadmill     [email or web address]   NuStep 10 min               FLEXIBILITY:        Heel Cord        Hamstring 5 min 5 min 5 min     Hip Flexors                  [employer] Portal  Evaluation: (  )  Manual Therapy (     ):   Therapeutic Modalities:                                                                                               HEP: As above; handouts given to patient for all exercises. Treatment/Session Assessment:    · Response to Treatment:  The patient tolerated today's treatment without symptom exacerbation. The patient demonstrated good exercise tolerance to the above exercises and was able to jog on the treadmill without spasms in his hamstring.  The patient is pleased with his steady improvements and is to try jogging again since he presently walks 12 minute miles. · Compliance with Program/Exercises: compliant all of the time. · Recommendations/Intent for next treatment session: \"Next visit will focus on advancements to more challenging activities\".    Total Treatment Duration:  PT Patient Time In/Time Out  Time In: 0800  Time Out: [address] Kranthi Bundy., PT

## 2018-01-29 ENCOUNTER — HOSPITAL ENCOUNTER (OUTPATIENT)
Dept: PHYSICAL THERAPY | Age: 58
Discharge: HOME | End: 2018-01-29
Payer: COMMERCIAL

## 2018-01-29 PROCEDURE — 97110 THERAPEUTIC EXERCISES: CPT

## 2018-01-30 NOTE — PROGRESS NOTES
Frank Faustin  : 1960 [facility]  [address], [facility], [address]  [phone]   [phone]       OUTPATIENT PHYSICAL THERAPY:Daily Note and Discharge 2018    ICD-10: Treatment Diagnosis:  R26.2 Difficulty in walking, not elsewhere classified   Precautions/Allergies:   Review of patient's allergies indicates not on file. Fall Risk Score: 1 (? 5 = High Risk)  MD Orders: Evaluate and treat MEDICAL/REFERRING DIAGNOSIS:  Strain of muscle, fascia and tendon of the posterior muscle group at thigh level, right thigh, initial encounter [address]: 3/2017  REFERRING PHYSICIAN: Dae Bowers MD  RETURN PHYSICIAN APPOINTMENT: TBD     INITIAL ASSESSMENT:  Mr. Altaf Heart reported running in 2017 and injuring his R hamstring. Since the initial injury his pain levels and function have improved, but he is still unable to run more than a short distance. He has been referred to physical therapy for treatment. *    PROBLEM LIST (Impacting functional limitations):  1. Decreased Strength  2. Decreased ADL/Functional Activities  3. Increased Pain  4. Decreased Activity Tolerance INTERVENTIONS PLANNED:  1. Home Exercise Program (HEP)  2. Neuromuscular Re-education/Strengthening  3. Range of Motion (ROM)  4. Therapeutic Exercise/Strengthening   TREATMENT PLAN:  Effective Dates: 2017 TO 2018. Frequency/Duration: 1 time every two weeks for 8 weeks  GOALS: (Goals have been discussed and agreed upon with patient.)                                                                  ALL GOALS ACHIEVED. SHORT-TERM FUNCTIONAL GOALS: Time Frame: 4 weeks  1. Increase LEFS 4 points to decrease pain 1-2 levels. 2. Increase R LE strength to decrease pain 1-2 levels. 3. Increase R LE strength to allow initiation of running again. Carter Evans DISCHARGE GOALS: Time Frame: 8 weeks  1.  Increase LEFS 9 points to decrease pain +2 levels. 2. Increase R LE strength to allow run again. .  3. Demonstrate independence in home exercises to allow DC from physical therapy. Rehabilitation Potential For Stated Goals: Good  Regarding Bettyjane Schwab Tumbull's therapy, I certify that the treatment plan above will be carried out by a therapist or under their direction. Thank you for this referral,  Birgit Leblanc., PT     Referring Physician Signature: Arnie Burnham MD              Date                    The information in this section was collected on 11/6/2017 (except where otherwise noted). HISTORY:   History of Present Injury/Illness (Reason for Referral): The patient reported running in March 2017 and injuring his R hamstring. Since the initial injury his pain levels and function have improved, but he is still unable to run more than a short distance. He has been referred to physical therapy for treatment. Past Medical History/Comorbidities:   Mr. Bakari Ferreira  has no past medical history on file. Mr. Bakari Ferreira  has no past surgical history on file. Social History/Living Environment:      Prior Level of Function/Work/Activity:  [employer]  Dominant Side:         RIGHT    Current Medications:     No current outpatient prescriptions on file. Date Last Reviewed: 1/29/2018   Number of Personal Factors/Comorbidities that affect the Plan of Care: 1-2: MODERATE COMPLEXITY   EXAMINATION:     Observation/Orthostatic Postural Assessment:   WNL. Palpation:   Tenderness to R hamstring origin. ROM: PROM : Knee   Hamstrings 50 degrees bilaterally. Strength:                                         1/29/2018     5/5 L LE Strength   -4/5 R LE Strength                   4/5           Special Tests: N/A  Neurological Screen: N/A  Functional Mobility: Independent   Balance:  Good. Body Structures Involved:  1. Muscles Body Functions Affected:  1. Sensory/Pain  2. Neuromusculoskeletal  3.  Movement Related Activities and Participation Affected:  1. General Tasks and Demands  2. Mobility  3. Community, Social and [place]   Number of elements (examined above) that affect the Plan of Care: 3: MODERATE COMPLEXITY   CLINICAL PRESENTATION:   Presentation: Evolving clinical presentation with changing clinical characteristics: MODERATE COMPLEXITY   CLINICAL DECISION MAKING:   Outcome Measure: Tool Used: Lower Extremity Functional Scale (LEFS)  Score:  Initial: 70/80 Most Recent:76/80 (Date: 1/29/2018 )   Interpretation of Score: 20 questions each scored on a 5 point scale with 0 representing \"extreme difficulty or unable to perform\" and 4 representing \"no difficulty\". The lower the score, the greater the functional disability. 80/80 represents no disability. Minimal detectable change is 9 points. Score 80 79-63 62-48 47-32 31-16 15-1 0   Modifier CH CI CJ CK CL CM CN     ? Mobility - Walking and Moving Around:     - CURRENT STATUS: CI - 1%-19% impaired, limited or restricted    - GOAL STATUS: CH - 0% impaired, limited or restricted    - D/C STATUS:  CI - 1%-19% impaired, limited or restricted      Medical Necessity:   · Patient demonstrates good rehab potential due to higher previous functional level. Reason for Services/Other Comments:  · Patient continues to require skilled intervention due to his inability to perform prolonged activities on his feet without R hamstring pain. .   Use of outcome tool(s) and clinical judgement create a POC that gives a: Questionable prediction of patient's progress: MODERATE COMPLEXITY            TREATMENT:   (In addition to Assessment/Re-Assessment sessions the following treatments were rendered)  Pre-treatment Symptoms/Complaints:  The patient reported continued improvement. Pain: Initial: Pain Intensity 1: 1  Post Session:  1   Therapeutic Exercise: ( 30): The patient received treatment as below to improve mobility, strength and balance.   Required moderate verbal and manual cues to promote proper body alignment, promote proper body posture and promote proper body mechanics. Progressed resistance, range and repetitions as indicated. The patient received exercise instruction followed by patient demonstration of the exercises to include AROM, PROM, and strengthening exercises for SLR, 4 Way Hip in standing with black t-band, single leg balance, crab walk with black t-band, R lateral planks, walking backward on t-mill, and hamstring stretching to stabilize the patient's LE and improve AROM to decrease spasms, pain, and improve function.    Date:  11/13/2017 Date:  11/27/2017 Date:  12/11/2017 Date:  01/02/2018 Date:  1/15/2018 Date:  1/29/2018   Activity/Exercise Parameters Parameters Parameters      MAT  EXERCISES:         Hip ER / Abduction 3 x 10        Hip Adduction (Ball Squeeze)         Bridging 3 x 10        Straight Leg Raise         SAQ         Hip Abduction (Sidelying) 3 x 10        Gluteus Medius (Clam Shell)         Sit To Stand                  STANDING HIP MACHINE:         Hip Abduction  [email or web address] x 15(B) [email or web address] x 15(B)      Hip Flexion  [email or web address] x 15(B) [email or web address] x 15(B)      Hip Extension  [email or web address] x 15(B) [email or web address] x 15(B) [email or web address] x 15(B) [email or web address] x 15(B) [email or web address] x 15(B)   Hip Adduction         Standing Knee Extension         4 Way Cable Walkout [email or web address] x 10 [email or web address] x 10  [email or web address] x 15     Gluteus Medius (Crab Walk)         Knee Extension [email or web address] x 10 [email or web address] x 10 [email or web address] x 15(B) [email or web address] x 15(B)     Knee Flexion [email or web address] x 10 [email or web address] x 10 [email or web address] x 15(B) [email or web address] x 15(B) [email or web address] x 15(B) [email or web address] x 15(B)   Step Downs 3 x 10                 Stationary Bike         Treadmill     [email or web address] 2 miles@5.0   NuStep 10 min                 FLEXIBILITY:         Heel Cord         Hamstring 5 min 5 min 5 min      Hip Flexors                    Med[employer] Portal  Evaluation: (  )  Manual Therapy (     ):   Therapeutic Modalities:                                                                                               HEP: As above; handouts given to patient for all exercises. Treatment/Session Assessment:    · Response to Treatment:  The patient tolerated today's treatment without symptom exacerbation. The patient demonstrated good exercise tolerance and was able to jog on the t-mill without symptoms. He achieved all goals and has been DC to a HEP. · Compliance with Program/Exercises: compliant all of the time. · Recommendations/Intent for next treatment session: DC to a HEP.   Total Treatment Duration:  PT Patient Time In/Time Out  Time In: 0800  Time Out: [address] [facility] Areli Prather, PT